# Patient Record
Sex: MALE | Race: WHITE | Employment: FULL TIME | URBAN - METROPOLITAN AREA
[De-identification: names, ages, dates, MRNs, and addresses within clinical notes are randomized per-mention and may not be internally consistent; named-entity substitution may affect disease eponyms.]

---

## 2018-07-24 ENCOUNTER — OFFICE VISIT (OUTPATIENT)
Dept: PEDIATRICS | Facility: CLINIC | Age: 38
End: 2018-07-24

## 2018-07-24 VITALS
DIASTOLIC BLOOD PRESSURE: 80 MMHG | HEART RATE: 78 BPM | TEMPERATURE: 97.4 F | WEIGHT: 210 LBS | BODY MASS INDEX: 27.71 KG/M2 | OXYGEN SATURATION: 96 % | SYSTOLIC BLOOD PRESSURE: 118 MMHG

## 2018-07-24 DIAGNOSIS — S86.011A RUPTURE OF ACHILLES TENDON, RIGHT, INITIAL ENCOUNTER: ICD-10-CM

## 2018-07-24 DIAGNOSIS — Z01.818 PREOP GENERAL PHYSICAL EXAM: Primary | ICD-10-CM

## 2018-07-24 LAB
ANION GAP SERPL CALCULATED.3IONS-SCNC: 7 MMOL/L (ref 3–14)
BUN SERPL-MCNC: 10 MG/DL (ref 7–30)
CALCIUM SERPL-MCNC: 8.9 MG/DL (ref 8.5–10.1)
CHLORIDE SERPL-SCNC: 106 MMOL/L (ref 94–109)
CO2 SERPL-SCNC: 28 MMOL/L (ref 20–32)
CREAT SERPL-MCNC: 0.88 MG/DL (ref 0.66–1.25)
ERYTHROCYTE [DISTWIDTH] IN BLOOD BY AUTOMATED COUNT: 12.4 % (ref 10–15)
GFR SERPL CREATININE-BSD FRML MDRD: >90 ML/MIN/1.7M2
GLUCOSE SERPL-MCNC: 102 MG/DL (ref 70–99)
HCT VFR BLD AUTO: 45.5 % (ref 40–53)
HGB BLD-MCNC: 15.4 G/DL (ref 13.3–17.7)
MCH RBC QN AUTO: 30.1 PG (ref 26.5–33)
MCHC RBC AUTO-ENTMCNC: 33.8 G/DL (ref 31.5–36.5)
MCV RBC AUTO: 89 FL (ref 78–100)
PLATELET # BLD AUTO: 229 10E9/L (ref 150–450)
POTASSIUM SERPL-SCNC: 3.8 MMOL/L (ref 3.4–5.3)
RBC # BLD AUTO: 5.11 10E12/L (ref 4.4–5.9)
SODIUM SERPL-SCNC: 141 MMOL/L (ref 133–144)
WBC # BLD AUTO: 6.4 10E9/L (ref 4–11)

## 2018-07-24 PROCEDURE — 99214 OFFICE O/P EST MOD 30 MIN: CPT | Performed by: NURSE PRACTITIONER

## 2018-07-24 PROCEDURE — 80048 BASIC METABOLIC PNL TOTAL CA: CPT | Performed by: NURSE PRACTITIONER

## 2018-07-24 PROCEDURE — 36415 COLL VENOUS BLD VENIPUNCTURE: CPT | Performed by: NURSE PRACTITIONER

## 2018-07-24 PROCEDURE — 85027 COMPLETE CBC AUTOMATED: CPT | Performed by: NURSE PRACTITIONER

## 2018-07-24 NOTE — PROGRESS NOTES
[address] [address]  [phone]  Dept: [phone]    PRE-OP EVALUATION:  Today's date: 2018    Denny Nelson (: 1980) presents for pre-operative evaluation assessment as requested by Dr. Espino.  He requires evaluation and anesthesia risk assessment prior to undergoing surgery/procedure for treatment of Achilles Tendon repair .    Proposed Surgery/ Procedure: Right Achilles Tendon repair   Date of Surgery/ Procedure: 18  Time of Surgery/ Procedure: 11:30am  Hospital/Surgical Facility: [facility]  Fax number for surgical facility: [phone]  Primary Physician: Pippa Talavera  Type of Anesthesia Anticipated: General    Patient has a Health Care Directive or Living Will:  NO    1. NO - Do you have a history of heart attack, stroke, stent, bypass or surgery on an artery in the head, neck, heart or legs?  2. NO - Do you ever have any pain or discomfort in your chest?  3. NO - Do you have a history of  Heart Failure?  4. NO - Are you troubled by shortness of breath when: walking on the level, up a slight hill or at night?  5. NO - Do you currently have a cold, bronchitis or other respiratory infection?  6. NO - Do you have a cough, shortness of breath or wheezing?  7. NO - Do you sometimes get pains in the calves of your legs when you walk?  8. NO - Do you or anyone in your family have previous history of blood clots?  9. NO - Do you or does anyone in your family have a serious bleeding problem such as prolonged bleeding following surgeries or cuts?  10. NO - Have you ever had problems with anemia or been told to take iron pills?  11. NO - Have you had any abnormal blood loss such as black, tarry or bloody stools, or abnormal vaginal bleeding?  12. NO - Have you ever had a blood transfusion?  13. NO - Have you or any of your relatives ever had problems with anesthesia?  14. NO - Do you have sleep apnea, excessive snoring or daytime  drowsiness?  15. NO - Do you have any prosthetic heart valves?  16. NO - Do you have prosthetic joints?  17. NO - Is there any chance that you may be pregnant?      HPI:     HPI related to upcoming procedure: treatment of Achilles Tendon repair .    Proposed Surgery/ Procedure: Right Achilles Tendon repair  Was a [employer] and was trying the warp wall and slipped and felt a pop on the right ankle     See problem list for active medical problems.  Problems all longstanding and stable, except as noted/documented.  See ROS for pertinent symptoms related to these conditions.                                                                                                                                                          .    MEDICAL HISTORY:     Patient Active Problem List    Diagnosis Date Noted     CARDIOVASCULAR SCREENING; LDL GOAL LESS THAN 160 01/26/2012     Priority: Medium      History reviewed. No pertinent past medical history.  History reviewed. No pertinent surgical history.  Current Outpatient Prescriptions   Medication Sig Dispense Refill     NO ACTIVE MEDICATIONS        OTC products: no recent use of OTC ASA, NSAIDS or Steroids and no use of herbal medications or other supplements    No Known Allergies   Latex Allergy: NO    Social History   Substance Use Topics     Smoking status: Never Smoker     Smokeless tobacco: Never Used     Alcohol use Yes      Comment: occassionally      History   Drug Use No       REVIEW OF SYSTEMS:   CONSTITUTIONAL: NEGATIVE for fever, chills, change in weight  INTEGUMENTARY/SKIN: NEGATIVE for rash   ENT/MOUTH: NEGATIVE for ear, mouth and throat problems  RESP: NEGATIVE for significant cough or SOB  CV: NEGATIVE for chest pain, palpitations or peripheral edema  GI: NEGATIVE for nausea, abdominal pain, heartburn, or change in bowel habits  : NEGATIVE for frequency, dysuria, or hematuria  MUSCULOSKELETAL:POSITIVE  for joint pain right ankle and NEGATIVE for joint swelling  and  joint warmth   NEURO: NEGATIVE for weakness, dizziness or paresthesias  ENDOCRINE: NEGATIVE for temperature intolerance, skin/hair changes  HEME: NEGATIVE for bleeding problems  PSYCHIATRIC: NEGATIVE for changes in mood or affect    EXAM:   /80  Pulse 78  Temp 97.4  F (36.3  C) (Temporal)  Wt 210 lb (95.3 kg)  SpO2 96%  BMI 27.71 kg/m2    GENERAL APPEARANCE: healthy, alert and no distress     EYES: Eyes grossly normal to inspection and conjunctivae and sclerae normal     HENT: ear canals and TM's normal and nose and mouth without ulcers or lesions     NECK: no adenopathy     RESP: lungs clear to auscultation - no rales, rhonchi or wheezes     CV: regular rates and rhythm, no murmur, click or rub and no irregular beats     ABDOMEN: soft, nontender     MS: right ankle in boot      SKIN: no suspicious lesions or rashes     NEURO: mentation intact and speech normal     PSYCH: mentation appears normal. and affect normal/bright     LYMPHATICS: No cervical adenopathy    DIAGNOSTICS:   EKG: Not indicated due to non-vascular surgery and low risk of event (age <65 and without cardiac risk factors)  Results for orders placed or performed in visit on 07/24/18   Basic metabolic panel   Result Value Ref Range    Sodium 141 133 - 144 mmol/L    Potassium 3.8 3.4 - 5.3 mmol/L    Chloride 106 94 - 109 mmol/L    Carbon Dioxide 28 20 - 32 mmol/L    Anion Gap 7 3 - 14 mmol/L    Glucose 102 (H) 70 - 99 mg/dL    Urea Nitrogen 10 7 - 30 mg/dL    Creatinine 0.88 0.66 - 1.25 mg/dL    GFR Estimate >90 >60 mL/min/1.7m2    GFR Estimate If Black >90 >60 mL/min/1.7m2    Calcium 8.9 8.5 - 10.1 mg/dL   CBC with platelets   Result Value Ref Range    WBC 6.4 4.0 - 11.0 10e9/L    RBC Count 5.11 4.4 - 5.9 10e12/L    Hemoglobin 15.4 13.3 - 17.7 g/dL    Hematocrit 45.5 40.0 - 53.0 %    MCV 89 78 - 100 fl    MCH 30.1 26.5 - 33.0 pg    MCHC 33.8 31.5 - 36.5 g/dL    RDW 12.4 10.0 - 15.0 %    Platelet Count 229 150 - 450 10e9/L   '  IMPRESSION:    Reason for surgery/procedure: treatment of Achilles Tendon repair .    Proposed Surgery/ Procedure: Right Achilles Tendon repair  Diagnosis/reason for consult: preop evaluation     The proposed surgical procedure is considered INTERMEDIATE risk.    REVISED CARDIAC RISK INDEX  The patient has the following serious cardiovascular risks for perioperative complications such as (MI, PE, VFib and 3  AV Block):  No serious cardiac risks  INTERPRETATION: 1 risks: Class II (low risk - 0.9% complication rate)    The patient has the following additional risks for perioperative complications:  No identified additional risks      ICD-10-CM    1. Preop general physical exam Z01.818 Basic metabolic panel     CBC with platelets   2. Rupture of Achilles tendon, right, initial encounter S86.011A Basic metabolic panel     CBC with platelets       RECOMMENDATIONS:       --Patient is to take all scheduled medications on the day of surgery EXCEPT for modifications listed below.  --Patient is on no chronic medications    APPROVAL GIVEN to proceed with proposed procedure, without further diagnostic evaluation       Signed Electronically by: JARVIS Haider CNP    Copy of this evaluation report is provided to requesting physician.    [facility] Preop Guidelines    Revised Cardiac Risk Index

## 2018-07-24 NOTE — MR AVS SNAPSHOT
After Visit Summary   7/24/2018    Denny Nelson    MRN: [id number]           Patient Information     Date Of Birth          1980        Visit Information        Provider Department      7/24/2018 9:10 AM Courtney Mott APRN CNP [facility]        Today's Diagnoses     Preop general physical exam    -  1    Rupture of Achilles tendon, right, initial encounter          Care Instructions    Will follow up and/or notify patient of  results via My Chart to determine further need for followup      Before Your Surgery      Call your surgeon if there is any change in your health. This includes signs of a cold or flu (such as a sore throat, runny nose, cough, rash or fever).    Do not smoke, drink alcohol or take over the counter medicine (unless your surgeon or primary care doctor tells you to) for the 24 hours before and after surgery.    If you take prescribed drugs: Follow your doctor s orders about which medicines to take and which to stop until after surgery.    Eating and drinking prior to surgery: follow the instructions from your surgeon    Take a shower or bath the night before surgery. Use the soap your surgeon gave you to gently clean your skin. If you do not have soap from your surgeon, use your regular soap. Do not shave or scrub the surgery site.  Wear clean pajamas and have clean sheets on your bed.   It was a pleasure seeing you today at the [facility]. Thank you for allowing us to care for you today. We truly hope we provided you with the excellent service you deserve. Please let us know if there is anything else we can do for you so we can be sure you are leaving completley satisfied with your care experience.       General information about your clinic   Clinic Hours Lab Hours (Appointments are required)   Mon-Thurs: 7:30 AM - 7 PM Mon-Thurs: 7:30 AM - 7 PM   Fri: 7:30 AM - 5 PM Fri: 7:30 AM - 5 PM        After Hours Nurse  Advise & Appts:  [facility] Nurse Advisors: [phone]  [facility] On Call: to make appointments anytime: [phone] On Call Physician: call [phone] and answering service will page the on call physician.        For urgent appointments, please call [phone] and ask for the triage nurse or your care team clinic nurse.  How to contact my care team:  MyChart: [email or web address]   Phone: [phone]   Fax: [phone]       [facility]:   Phone: [phone]  Hours: 8:00 AM - 6:00 PM  Medication Refills:  Call your pharmacy and they will forward the refill to us. Please allow 3 business days for your refills to be completed.       Normal or non-critical lab and imaging results will be communicated to you by MyChart, letter or phone within 7 days.  If you do not hear from us within 10 days, please call the clinic. If you have a critical or abnormal lab result, we will notify you by phone as soon as possible.       We now have PWIC (Pediatric Walk in Care)  Monday-Friday from 7:30-4. Simply walk in and be seen for your urgent needs like cough, fever, rash, diarrhea or vomiting, pink eye, UTI. No appointments needed. Ask one of the team for more information      -Your Care Team:    Dr. Pippa Talavera - Internal Medicine/Pediatrics   Dr. Brisa Saucedo - Family Medicine  Dr. Shiela Waterman - Pediatrics  Dr. Jessi Chance - Pediatrics  Courtney Mott CNP - Family Practice Nurse Practitioner                         Follow-ups after your visit        Who to contact     If you have questions or need follow up information about today's clinic visit or your schedule please contact [facility] directly at [phone].  Normal or non-critical lab and imaging results will be communicated to you by MyChart, letter or phone within 4 business days after the clinic has received the results. If you do not hear from us within 7 days, please contact the clinic through MyChart or phone. If you have a  critical or abnormal lab result, we will notify you by phone as soon as possible.  Submit refill requests through Nudge or call your pharmacy and they will forward the refill request to us. Please allow 3 business days for your refill to be completed.          Additional Information About Your Visit        General Bloodhart Information     Nudge gives you secure access to your electronic health record. If you see a primary care provider, you can also send messages to your care team and make appointments. If you have questions, please call your primary care clinic.  If you do not have a primary care provider, please call [phone] and they will assist you.      Nudge is an electronic gateway that provides easy, online access to your medical records. With Nudge, you can request a clinic appointment, read your test results, renew a prescription or communicate with your care team.     To access your existing account, please contact your [facility] or call [phone] for assistance.        Care EveryWhere ID     This is your Care EveryWhere ID. This could be used by other organizations to access your [place] medical records  [id number]        Your Vitals Were     Pulse Temperature Pulse Oximetry BMI (Body Mass Index)          78 97.4  F (36.3  C) (Temporal) 96% 27.71 kg/m2         Blood Pressure from Last 3 Encounters:   07/24/18 118/80   01/26/12 121/85    Weight from Last 3 Encounters:   07/24/18 210 lb (95.3 kg)   01/26/12 199 lb (90.3 kg)              We Performed the Following     Basic metabolic panel     CBC with platelets        Primary Care Provider Office Phone # Fax #    Pippa Talavera MD PhD [phone]       [address]  [facility] [address]        Equal Access to Services     RADHA STROUD : Leticia Martínez, bebeto denise, gonzalez chaney. So [facility] [phone].    ATENCIÓN: Si elvala  español, tiene a krishna disposición servicios gratuitos de asistencia lingüística. Frank hanks [phone].    We comply with applicable federal civil rights laws and [place] laws. We do not discriminate on the basis of race, color, national origin, age, disability, sex, sexual orientation, or gender identity.            Thank you!     Thank you for choosing [facility]  for your care. Our goal is always to provide you with excellent care. Hearing back from our patients is one way we can continue to improve our services. Please take a few minutes to complete the written survey that you may receive in the mail after your visit with us. Thank you!             Your Updated Medication List - Protect others around you: Learn how to safely use, store and throw away your medicines at www.disposemymeds.org.          This list is accurate as of 7/24/18  9:29 AM.  Always use your most recent med list.                   Brand Name Dispense Instructions for use Diagnosis    NO ACTIVE MEDICATIONS

## 2018-07-24 NOTE — PROGRESS NOTES
Madiha Nelson,    Attached are your test results.  -Kidney function is normal (Cr, GFR), Sodium is normal, Potassium is normal, Calcium is normal, Glucose is normal (diabetes screening test).   -Normal red blood cell (hgb) levels, normal white blood cell count and normal platelet levels.   Please contact us if you have any questions.    Courtney Mott, CNP

## 2018-07-24 NOTE — PATIENT INSTRUCTIONS
Will follow up and/or notify patient of  results via My Chart to determine further need for followup      Before Your Surgery      Call your surgeon if there is any change in your health. This includes signs of a cold or flu (such as a sore throat, runny nose, cough, rash or fever).    Do not smoke, drink alcohol or take over the counter medicine (unless your surgeon or primary care doctor tells you to) for the 24 hours before and after surgery.    If you take prescribed drugs: Follow your doctor s orders about which medicines to take and which to stop until after surgery.    Eating and drinking prior to surgery: follow the instructions from your surgeon    Take a shower or bath the night before surgery. Use the soap your surgeon gave you to gently clean your skin. If you do not have soap from your surgeon, use your regular soap. Do not shave or scrub the surgery site.  Wear clean pajamas and have clean sheets on your bed.   It was a pleasure seeing you today at the [facility]. Thank you for allowing us to care for you today. We truly hope we provided you with the excellent service you deserve. Please let us know if there is anything else we can do for you so we can be sure you are leaving completley satisfied with your care experience.       General information about your clinic   Clinic Hours Lab Hours (Appointments are required)   Mon-Thurs: 7:30 AM - 7 PM Mon-Thurs: 7:30 AM - 7 PM   Fri: 7:30 AM - 5 PM Fri: 7:30 AM - 5 PM        After Hours Nurse Advise & Appts:  [facility] Nurse Advisors: [phone]  [facility] On Call: to make appointments anytime: [phone] On Call Physician: call [phone] and answering service will page the on call physician.        For urgent appointments, please call [phone] and ask for the triage nurse or your care team clinic nurse.  How to contact my care team:  MyChart: [email or web address]   Phone: [phone]   Fax: [phone]        [facility]:   Phone: [phone]  Hours: 8:00 AM - 6:00 PM  Medication Refills:  Call your pharmacy and they will forward the refill to us. Please allow 3 business days for your refills to be completed.       Normal or non-critical lab and imaging results will be communicated to you by MyChart, letter or phone within 7 days.  If you do not hear from us within 10 days, please call the clinic. If you have a critical or abnormal lab result, we will notify you by phone as soon as possible.       We now have PWIC (Pediatric Walk in Care)  Monday-Friday from 7:30-4. Simply walk in and be seen for your urgent needs like cough, fever, rash, diarrhea or vomiting, pink eye, UTI. No appointments needed. Ask one of the team for more information      -Your Care Team:    Dr. Pippa Talavera - Internal Medicine/Pediatrics   Dr. Brisa Saucedo - Family Medicine  Dr. Shiela Waterman - Pediatrics  Dr. Jessi Chance - Pediatrics  Courtney Mott CNP - Family Practice Nurse Practitioner

## 2020-02-24 ENCOUNTER — HEALTH MAINTENANCE LETTER (OUTPATIENT)
Age: 40
End: 2020-02-24

## 2020-12-13 ENCOUNTER — HEALTH MAINTENANCE LETTER (OUTPATIENT)
Age: 40
End: 2020-12-13

## 2021-04-17 ENCOUNTER — HEALTH MAINTENANCE LETTER (OUTPATIENT)
Age: 41
End: 2021-04-17

## 2021-09-26 ENCOUNTER — HEALTH MAINTENANCE LETTER (OUTPATIENT)
Age: 41
End: 2021-09-26

## 2021-12-17 ENCOUNTER — OFFICE VISIT (OUTPATIENT)
Dept: URGENT CARE | Facility: URGENT CARE | Age: 41
End: 2021-12-17

## 2021-12-17 VITALS
BODY MASS INDEX: 26.33 KG/M2 | WEIGHT: 199.6 LBS | OXYGEN SATURATION: 98 % | DIASTOLIC BLOOD PRESSURE: 80 MMHG | HEART RATE: 94 BPM | TEMPERATURE: 98.8 F | SYSTOLIC BLOOD PRESSURE: 129 MMHG

## 2021-12-17 DIAGNOSIS — R51.9 FRONTAL HEADACHE: Primary | ICD-10-CM

## 2021-12-17 DIAGNOSIS — R63.1 POLYDIPSIA: ICD-10-CM

## 2021-12-17 LAB
ALBUMIN UR-MCNC: NEGATIVE MG/DL
APPEARANCE UR: CLEAR
BASOPHILS # BLD AUTO: 0 10E3/UL (ref 0–0.2)
BASOPHILS NFR BLD AUTO: 0 %
BILIRUB UR QL STRIP: NEGATIVE
COLOR UR AUTO: YELLOW
EOSINOPHIL # BLD AUTO: 0 10E3/UL (ref 0–0.7)
EOSINOPHIL NFR BLD AUTO: 0 %
ERYTHROCYTE [DISTWIDTH] IN BLOOD BY AUTOMATED COUNT: 12.2 % (ref 10–15)
GLUCOSE BLD-MCNC: 109 MG/DL (ref 60–99)
GLUCOSE UR STRIP-MCNC: NEGATIVE MG/DL
HBA1C MFR BLD: 5.2 % (ref 0–5.6)
HCT VFR BLD AUTO: 41.2 % (ref 40–53)
HGB BLD-MCNC: 14.1 G/DL (ref 13.3–17.7)
HGB UR QL STRIP: NEGATIVE
IMM GRANULOCYTES # BLD: 0 10E3/UL
IMM GRANULOCYTES NFR BLD: 0 %
KETONES UR STRIP-MCNC: ABNORMAL MG/DL
LEUKOCYTE ESTERASE UR QL STRIP: NEGATIVE
LYMPHOCYTES # BLD AUTO: 0.6 10E3/UL (ref 0.8–5.3)
LYMPHOCYTES NFR BLD AUTO: 16 %
MCH RBC QN AUTO: 30.2 PG (ref 26.5–33)
MCHC RBC AUTO-ENTMCNC: 34.2 G/DL (ref 31.5–36.5)
MCV RBC AUTO: 88 FL (ref 78–100)
MONOCYTES # BLD AUTO: 0.4 10E3/UL (ref 0–1.3)
MONOCYTES NFR BLD AUTO: 11 %
NEUTROPHILS # BLD AUTO: 2.7 10E3/UL (ref 1.6–8.3)
NEUTROPHILS NFR BLD AUTO: 72 %
NITRATE UR QL: NEGATIVE
PH UR STRIP: 5.5 [PH] (ref 5–7)
PLATELET # BLD AUTO: 135 10E3/UL (ref 150–450)
RBC # BLD AUTO: 4.67 10E6/UL (ref 4.4–5.9)
SP GR UR STRIP: 1.02 (ref 1–1.03)
UROBILINOGEN UR STRIP-ACNC: 0.2 E.U./DL
WBC # BLD AUTO: 3.7 10E3/UL (ref 4–11)

## 2021-12-17 PROCEDURE — 83036 HEMOGLOBIN GLYCOSYLATED A1C: CPT | Performed by: NURSE PRACTITIONER

## 2021-12-17 PROCEDURE — 85025 COMPLETE CBC W/AUTO DIFF WBC: CPT | Performed by: NURSE PRACTITIONER

## 2021-12-17 PROCEDURE — 36415 COLL VENOUS BLD VENIPUNCTURE: CPT | Performed by: NURSE PRACTITIONER

## 2021-12-17 PROCEDURE — 99213 OFFICE O/P EST LOW 20 MIN: CPT | Performed by: NURSE PRACTITIONER

## 2021-12-17 PROCEDURE — 81003 URINALYSIS AUTO W/O SCOPE: CPT | Performed by: NURSE PRACTITIONER

## 2021-12-17 PROCEDURE — 82947 ASSAY GLUCOSE BLOOD QUANT: CPT | Performed by: NURSE PRACTITIONER

## 2021-12-17 RX ORDER — NAPROXEN 500 MG/1
500 TABLET ORAL 2 TIMES DAILY WITH MEALS
Qty: 28 TABLET | Refills: 0 | Status: SHIPPED | OUTPATIENT
Start: 2021-12-17 | End: 2021-12-31

## 2021-12-17 ASSESSMENT — ENCOUNTER SYMPTOMS: HEADACHES: 1

## 2021-12-17 NOTE — PROGRESS NOTES
SUBJECTIVE:   Denny Nelson is a 41 year old male presenting with a chief complaint of   Chief Complaint   Patient presents with     Headache     normally does not get hedache pt has had it for 1 week,  headache about the same, pt is having some light headedness     appetite     pt has loss of appetite 1 week        He is a new patient of [facility].    Headache    Onset of symptoms was 1week(s).  Course of illness is worsening  Severity moderate  Character of pain:throbbing   Current and associated symptoms: light sensitivity  Location of pain: frontal and retro-orbital  Prodromal sx?:  No  History of Migranes: No  Is headache similar to previous: No:   Predisposing factors: None  Treatment and measures tried: Tylenol and Ibuprofen, helps briefly        Review of Systems   Neurological: Positive for headaches.   All other systems reviewed and are negative.      No past medical history on file.  Family History   Problem Relation Age of Onset     Breast Cancer Paternal Grandmother 75     Current Outpatient Medications   Medication Sig Dispense Refill     naproxen (NAPROSYN) 500 MG tablet Take 1 tablet (500 mg) by mouth 2 times daily (with meals) for 14 days 28 tablet 0     NO ACTIVE MEDICATIONS        Social History     Tobacco Use     Smoking status: Never Smoker     Smokeless tobacco: Never Used   Substance Use Topics     Alcohol use: Yes     Comment: occassionally        OBJECTIVE  /80 (BP Location: Left arm, Patient Position: Sitting, Cuff Size: Adult Regular)   Pulse 94   Temp 98.8  F (37.1  C) (Oral)   Wt 90.5 kg (199 lb 9.6 oz)   SpO2 98%   BMI 26.33 kg/m      Physical Exam  Cardiovascular:      Rate and Rhythm: Normal rate.   Pulmonary:      Effort: Pulmonary effort is normal.   Neurological:      General: No focal deficit present.      Mental Status: He is alert.      Comments: NEURO:  equal  strength, neg Romberg, DTR II/IV bilaterally (UE and LE), finger to nose normal, CN intact,  "ambulates without difficulty.  no focal deficits noted.   Psychiatric:         Mood and Affect: Mood normal.         ASSESSMENT:      ICD-10-CM    1. Frontal headache  R51.9 Glucose, whole blood     UA Macro with Reflex to Micro and Culture - lab collect     CBC with Platelets & Differential     naproxen (NAPROSYN) 500 MG tablet   2. Polydipsia  R63.1 Hemoglobin A1c        PLAN:  I discussed lab results with the patient.  Patient educational/instructional material provided including reasons for follow-up    The patient indicates understanding of these issues and agrees with the plan.        Patient Instructions     Patient Education     Self-Care for Headaches  Most headaches aren't serious and can be relieved with self-care. But some headaches may be a sign of another health problem like eye trouble or high blood pressure. To find the best treatment, learn what kind of headaches you get. For tension headaches, self-care will usually help. To treat migraines, ask your healthcare provider for advice. It's also possible to get both tension and migraine headaches. Self-care involves relieving the pain and avoiding headache \"triggers\" if you can.     Ways to reduce pain and tension  Try these steps:    Apply a cold compress or ice pack to the pain site.    Drink fluids. If nausea makes it hard to drink, try sucking on ice.    Rest. Protect yourself from bright light and loud noises.    Calm your emotions by imagining a peaceful scene.    Massage tight neck, shoulder, and head muscles.    To relax muscles, soak in a hot bath or use a hot shower.  Use medicines  Aspirin or other over-the-counter (OTC) pain medicines such as ibuprofen and acetaminophen can relieve headache. Remember: Never give aspirin to anyone 18 years old or younger because of the risk of getting Reye syndrome. Use pain medicines only when needed. Certain prescription and OTC medicines can lead to rebound headaches if they are taken too often. Check " "with your healthcare provider or pharmacist about your medicines.   Track your headaches  Keeping a headache diary can help you and your healthcare provider identify what's causing your headaches:     Note when each headache happens.    Identify your activities and the foods you've eaten 6 to 8 hours before the headache began.    Look for any trends or \"triggers.\"    Bring the information to your next medical appointment.  Signs of tension headache  Any of the following can be signs:     Dull pain or feeling of pressure in a tight band around your head    Pain in your neck or shoulders    Headache without a definite beginning or end    Headache after an activity such as driving or working on a computer  Signs of migraine  Any of the following can be signs:     Throbbing pain on one or both sides of your head    Nausea or vomiting    Extreme sensitivity to light, sound, and smells    Bright spots, flashes, or other visual changes    Pain or nausea so severe that you can't continue your daily activities  When to call your healthcare provider   Call your healthcare provider if any of these occur:     A headache that lingers after a recent injury or bump to the head    A headache that lasts for more than 3 days    Frequent headaches, especially in the morning  Get medical care right away if you have:    A headache along with slurred speech, changes in your vision, or numbness or weakness in your arms or legs    Headaches with seizures    A fever with a stiff neck or pain when you bend your head toward your chest    A headache that you would call \"the worst headache you have ever had\" or a severe, persistent headache that gets worse or doesn't get better with treatment  [place] last reviewed this educational content on 7/1/2020 2000-2021 [employer]. All rights reserved. This information is not intended as a substitute for professional medical care. Always follow your healthcare professional's " instructions.

## 2021-12-18 NOTE — PATIENT INSTRUCTIONS
"  Patient Education     Self-Care for Headaches  Most headaches aren't serious and can be relieved with self-care. But some headaches may be a sign of another health problem like eye trouble or high blood pressure. To find the best treatment, learn what kind of headaches you get. For tension headaches, self-care will usually help. To treat migraines, ask your healthcare provider for advice. It's also possible to get both tension and migraine headaches. Self-care involves relieving the pain and avoiding headache \"triggers\" if you can.     Ways to reduce pain and tension  Try these steps:    Apply a cold compress or ice pack to the pain site.    Drink fluids. If nausea makes it hard to drink, try sucking on ice.    Rest. Protect yourself from bright light and loud noises.    Calm your emotions by imagining a peaceful scene.    Massage tight neck, shoulder, and head muscles.    To relax muscles, soak in a hot bath or use a hot shower.  Use medicines  Aspirin or other over-the-counter (OTC) pain medicines such as ibuprofen and acetaminophen can relieve headache. Remember: Never give aspirin to anyone 18 years old or younger because of the risk of getting Reye syndrome. Use pain medicines only when needed. Certain prescription and OTC medicines can lead to rebound headaches if they are taken too often. Check with your healthcare provider or pharmacist about your medicines.   Track your headaches  Keeping a headache diary can help you and your healthcare provider identify what's causing your headaches:     Note when each headache happens.    Identify your activities and the foods you've eaten 6 to 8 hours before the headache began.    Look for any trends or \"triggers.\"    Bring the information to your next medical appointment.  Signs of tension headache  Any of the following can be signs:     Dull pain or feeling of pressure in a tight band around your head    Pain in your neck or shoulders    Headache without a definite " "beginning or end    Headache after an activity such as driving or working on a computer  Signs of migraine  Any of the following can be signs:     Throbbing pain on one or both sides of your head    Nausea or vomiting    Extreme sensitivity to light, sound, and smells    Bright spots, flashes, or other visual changes    Pain or nausea so severe that you can't continue your daily activities  When to call your healthcare provider   Call your healthcare provider if any of these occur:     A headache that lingers after a recent injury or bump to the head    A headache that lasts for more than 3 days    Frequent headaches, especially in the morning  Get medical care right away if you have:    A headache along with slurred speech, changes in your vision, or numbness or weakness in your arms or legs    Headaches with seizures    A fever with a stiff neck or pain when you bend your head toward your chest    A headache that you would call \"the worst headache you have ever had\" or a severe, persistent headache that gets worse or doesn't get better with treatment  [employer] last reviewed this educational content on 7/1/2020 2000-2021 [employer]. All rights reserved. This information is not intended as a substitute for professional medical care. Always follow your healthcare professional's instructions.           "

## 2022-05-08 ENCOUNTER — HEALTH MAINTENANCE LETTER (OUTPATIENT)
Age: 42
End: 2022-05-08

## 2023-04-23 ENCOUNTER — HEALTH MAINTENANCE LETTER (OUTPATIENT)
Age: 43
End: 2023-04-23

## 2023-06-02 ENCOUNTER — HEALTH MAINTENANCE LETTER (OUTPATIENT)
Age: 43
End: 2023-06-02

## 2024-06-30 ENCOUNTER — HEALTH MAINTENANCE LETTER (OUTPATIENT)
Age: 44
End: 2024-06-30

## 2025-07-13 ENCOUNTER — HEALTH MAINTENANCE LETTER (OUTPATIENT)
Age: 45
End: 2025-07-13